# Patient Record
Sex: MALE | Race: WHITE | NOT HISPANIC OR LATINO | Employment: FULL TIME | URBAN - METROPOLITAN AREA
[De-identification: names, ages, dates, MRNs, and addresses within clinical notes are randomized per-mention and may not be internally consistent; named-entity substitution may affect disease eponyms.]

---

## 2023-12-21 ENCOUNTER — APPOINTMENT (OUTPATIENT)
Dept: ENDOCRINOLOGY | Facility: CLINIC | Age: 38
End: 2023-12-21

## 2024-01-12 DIAGNOSIS — F95.2 TOURETTE'S DISORDER: ICD-10-CM

## 2024-01-12 RX ORDER — TOPIRAMATE 50 MG/1
TABLET, FILM COATED ORAL
Qty: 270 TABLET | Refills: 2 | Status: SHIPPED | OUTPATIENT
Start: 2024-01-12 | End: 2024-04-01 | Stop reason: SDUPTHER

## 2024-03-14 ENCOUNTER — DOCUMENTATION (OUTPATIENT)
Dept: PEDIATRICS | Facility: CLINIC | Age: 39
End: 2024-03-14
Payer: COMMERCIAL

## 2024-03-22 PROBLEM — L72.3 INFECTED SEBACEOUS CYST OF SKIN: Status: RESOLVED | Noted: 2024-03-22 | Resolved: 2024-03-22

## 2024-03-22 PROBLEM — L08.9 INFECTED SEBACEOUS CYST OF SKIN: Status: RESOLVED | Noted: 2024-03-22 | Resolved: 2024-03-22

## 2024-03-22 PROBLEM — F95.2 GILLES DE LA TOURETTE'S SYNDROME: Status: ACTIVE | Noted: 2024-03-22

## 2024-03-22 PROBLEM — J30.2 SEASONAL ALLERGIES: Status: ACTIVE | Noted: 2024-03-22

## 2024-03-22 PROBLEM — S01.81XA FACIAL LACERATION: Status: RESOLVED | Noted: 2024-03-22 | Resolved: 2024-03-22

## 2024-03-22 PROBLEM — J45.909 ASTHMA (HHS-HCC): Status: ACTIVE | Noted: 2024-03-22

## 2024-03-22 RX ORDER — BUDESONIDE AND FORMOTEROL FUMARATE DIHYDRATE 80; 4.5 UG/1; UG/1
AEROSOL RESPIRATORY (INHALATION)
COMMUNITY
End: 2024-04-01 | Stop reason: SDUPTHER

## 2024-03-22 RX ORDER — CETIRIZINE HYDROCHLORIDE 10 MG/1
TABLET ORAL EVERY 24 HOURS
COMMUNITY
End: 2024-04-01 | Stop reason: SDUPTHER

## 2024-04-01 ENCOUNTER — OFFICE VISIT (OUTPATIENT)
Dept: PRIMARY CARE | Facility: CLINIC | Age: 39
End: 2024-04-01
Payer: COMMERCIAL

## 2024-04-01 VITALS
OXYGEN SATURATION: 97 % | WEIGHT: 170 LBS | HEART RATE: 64 BPM | BODY MASS INDEX: 25.76 KG/M2 | HEIGHT: 68 IN | TEMPERATURE: 98 F | SYSTOLIC BLOOD PRESSURE: 124 MMHG | DIASTOLIC BLOOD PRESSURE: 72 MMHG

## 2024-04-01 DIAGNOSIS — Z80.9 FAMILY HISTORY OF CANCER: ICD-10-CM

## 2024-04-01 DIAGNOSIS — E03.9 HYPOTHYROIDISM, UNSPECIFIED TYPE: ICD-10-CM

## 2024-04-01 DIAGNOSIS — J45.30 MILD PERSISTENT ASTHMA WITHOUT COMPLICATION (HHS-HCC): ICD-10-CM

## 2024-04-01 DIAGNOSIS — B35.1 ONYCHOMYCOSIS: ICD-10-CM

## 2024-04-01 DIAGNOSIS — Z00.00 ANNUAL PHYSICAL EXAM: Primary | ICD-10-CM

## 2024-04-01 DIAGNOSIS — Z23 ENCOUNTER FOR IMMUNIZATION: ICD-10-CM

## 2024-04-01 DIAGNOSIS — R73.9 HYPERGLYCEMIA: ICD-10-CM

## 2024-04-01 DIAGNOSIS — J30.2 SEASONAL ALLERGIES: ICD-10-CM

## 2024-04-01 DIAGNOSIS — E78.2 MIXED HYPERLIPIDEMIA: ICD-10-CM

## 2024-04-01 DIAGNOSIS — F95.2 GILLES DE LA TOURETTE'S SYNDROME: ICD-10-CM

## 2024-04-01 DIAGNOSIS — Z01.89 ENCOUNTER FOR ROUTINE LABORATORY TESTING: ICD-10-CM

## 2024-04-01 DIAGNOSIS — M79.605 PAIN OF LEFT LOWER EXTREMITY: ICD-10-CM

## 2024-04-01 DIAGNOSIS — E55.9 VITAMIN D DEFICIENCY: ICD-10-CM

## 2024-04-01 PROCEDURE — 99395 PREV VISIT EST AGE 18-39: CPT | Performed by: STUDENT IN AN ORGANIZED HEALTH CARE EDUCATION/TRAINING PROGRAM

## 2024-04-01 PROCEDURE — 1036F TOBACCO NON-USER: CPT | Performed by: STUDENT IN AN ORGANIZED HEALTH CARE EDUCATION/TRAINING PROGRAM

## 2024-04-01 RX ORDER — ALBUTEROL SULFATE 90 UG/1
2 AEROSOL, METERED RESPIRATORY (INHALATION) EVERY 4 HOURS PRN
Qty: 8 G | Refills: 1 | Status: SHIPPED | OUTPATIENT
Start: 2024-04-01 | End: 2024-05-17

## 2024-04-01 RX ORDER — BUDESONIDE AND FORMOTEROL FUMARATE DIHYDRATE 80; 4.5 UG/1; UG/1
2 AEROSOL RESPIRATORY (INHALATION)
Qty: 10.2 G | Refills: 11 | Status: SHIPPED | OUTPATIENT
Start: 2024-04-01 | End: 2025-04-01

## 2024-04-01 RX ORDER — CETIRIZINE HYDROCHLORIDE 10 MG/1
10 TABLET ORAL DAILY
Start: 2024-04-01

## 2024-04-01 RX ORDER — TIOTROPIUM BROMIDE 18 UG/1
1 CAPSULE ORAL; RESPIRATORY (INHALATION)
Qty: 30 CAPSULE | Refills: 1 | Status: SHIPPED | OUTPATIENT
Start: 2024-04-01 | End: 2024-05-31

## 2024-04-01 RX ORDER — TOPIRAMATE 50 MG/1
TABLET, FILM COATED ORAL
Qty: 270 TABLET | Refills: 3 | Status: SHIPPED | OUTPATIENT
Start: 2024-04-01 | End: 2025-04-01

## 2024-04-01 ASSESSMENT — ENCOUNTER SYMPTOMS
HEMATOLOGIC/LYMPHATIC NEGATIVE: 1
SHORTNESS OF BREATH: 1
GASTROINTESTINAL NEGATIVE: 1
ALLERGIC/IMMUNOLOGIC NEGATIVE: 1
WHEEZING: 1
NEUROLOGICAL NEGATIVE: 1
ENDOCRINE NEGATIVE: 1
PSYCHIATRIC NEGATIVE: 1
EYES NEGATIVE: 1
CARDIOVASCULAR NEGATIVE: 1
MUSCULOSKELETAL NEGATIVE: 1
CONSTITUTIONAL NEGATIVE: 1

## 2024-04-01 ASSESSMENT — PAIN SCALES - GENERAL: PAINLEVEL: 0-NO PAIN

## 2024-04-01 NOTE — PROGRESS NOTES
[facility]: [facility]  PHYSICAL EXAM      Abimael Fischer is a 38 y.o. male that is presenting today for Follow-up.    Assessment/Plan   Diagnoses and all orders for this visit:  Annual physical exam  -     Follow Up In Primary Care; Future  Encounter for routine laboratory testing  -     CBC and Auto Differential; Future  -     Basic Metabolic Panel; Future  -     Hepatic Function Panel; Future  -     Lipid Panel; Future  -     Hemoglobin A1C; Future  -     Vitamin D 25-Hydroxy,Total (for eval of Vitamin D levels); Future  -     TSH with reflex to Free T4 if abnormal; Future  Hypothyroidism, unspecified type  -     TSH with reflex to Free T4 if abnormal; Future  Mixed hyperlipidemia  -     Hepatic Function Panel; Future  -     Lipid Panel; Future  Vitamin D deficiency  -     Vitamin D 25-Hydroxy,Total (for eval of Vitamin D levels); Future  Hyperglycemia  -     Hemoglobin A1C; Future  Encounter for immunization  Mild persistent asthma without complication  -     budesonide-formoteroL (Symbicort) 80-4.5 mcg/actuation inhaler; Inhale 2 puffs 2 times a day. Rinse mouth with water after use to reduce aftertaste and incidence of candidiasis. Do not swallow.  -     cetirizine (ZyrTEC) 10 mg tablet; Take 1 tablet (10 mg) by mouth once daily.  -     albuterol (Ventolin HFA) 90 mcg/actuation inhaler; Inhale 2 puffs every 4 hours if needed for wheezing or shortness of breath.  -     tiotropium (Spiriva) 18 mcg inhalation capsule; Place 1 capsule (18 mcg) into inhaler and inhale once daily.  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  Pain of left lower extremity  -     Referral to Physical Therapy; Future  Family  history of cancer  Comments:  Leiomyosarcoma. Patient requesting referral to genetics. Discussed with him that I will refer although I suspect that they will not call him back.  Orders:  -     Referral to Genetics; Future  Seasonal allergies  -     budesonide-formoteroL (Symbicort) 80-4.5 mcg/actuation inhaler; Inhale 2 puffs 2 times a day. Rinse mouth with water after use to reduce aftertaste and incidence of candidiasis. Do not swallow.  -     cetirizine (ZyrTEC) 10 mg tablet; Take 1 tablet (10 mg) by mouth once daily.  -     albuterol (Ventolin HFA) 90 mcg/actuation inhaler; Inhale 2 puffs every 4 hours if needed for wheezing or shortness of breath.  -     tiotropium (Spiriva) 18 mcg inhalation capsule; Place 1 capsule (18 mcg) into inhaler and inhale once daily.  Onychomycosis  -     Referral to Podiatry; Future    Discussed routine and/or preventative care with the patient as outlined below:  - Labwork:   - Patient appears to be due for labwork. Ordered today.    - Imaging:   - It has been awhile since the patient has gotten routine PFTs for his asthma, will order these today.  - Immunizations:   - Discussed seasonal immunizations, including the influenza and COVID-19 immunizations.  - Patient does not appear to be due for routine immunizations at this time.   - Significant medication and problem list reconciliation done today.  - Patient notes that his rescue inhaler usage has gone up somewhat secondary to pet dander in his house. Now using his rescue multiple times / week with significant improvement in symptoms. Trial Spiriva, will give one month supply to see if this helps the situation.  - Patient noting what sounds like tingling on his Right anterolateral thigh. Somewhat persistent. Notes questionable inciting event. Will refer to physical therapy to see if this helps at all. Unclear if this is a simple musculoskeletal strain vs potential neuropathy.   - Patient noting problems with his big toes  bilaterally.    Subjective   - The patient otherwise feels well and denies any acute symptoms or concerns at this time.  - The patient denies any changes or progression of their chronic medical problems.  - The patient denies any problems or concerns with their medications.      Review of Systems   Constitutional: Negative.    HENT: Negative.     Eyes: Negative.    Respiratory:  Positive for shortness of breath and wheezing.    Cardiovascular: Negative.    Gastrointestinal: Negative.    Endocrine: Negative.    Genitourinary: Negative.    Musculoskeletal: Negative.    Skin: Negative.    Allergic/Immunologic: Negative.    Neurological: Negative.    Hematological: Negative.    Psychiatric/Behavioral: Negative.     All other systems reviewed and are negative.     Objective   Vitals:    04/01/24 1414   BP: 124/72   Pulse: 64   Temp: 36.7 °C (98 °F)   SpO2: 97%     Body mass index is 25.85 kg/m².  Physical Exam  Vitals and nursing note reviewed.   Constitutional:       General: He is not in acute distress.     Appearance: Normal appearance. He is not ill-appearing.   HENT:      Head: Normocephalic and atraumatic.      Right Ear: Tympanic membrane, ear canal and external ear normal. There is no impacted cerumen.      Left Ear: Tympanic membrane, ear canal and external ear normal. There is no impacted cerumen.      Nose: Nose normal.      Mouth/Throat:      Mouth: Mucous membranes are moist.      Pharynx: Oropharynx is clear. No oropharyngeal exudate or posterior oropharyngeal erythema.   Eyes:      General: No scleral icterus.        Right eye: No discharge.         Left eye: No discharge.      Extraocular Movements: Extraocular movements intact.      Conjunctiva/sclera: Conjunctivae normal.      Pupils: Pupils are equal, round, and reactive to light.   Neck:      Vascular: No carotid bruit.   Cardiovascular:      Rate and Rhythm: Normal rate and regular rhythm.      Pulses: Normal pulses.      Heart sounds: Normal heart  "sounds. No murmur heard.     No friction rub. No gallop.   Pulmonary:      Effort: Pulmonary effort is normal. No respiratory distress.      Breath sounds: Normal breath sounds.   Abdominal:      General: Abdomen is flat. Bowel sounds are normal.      Palpations: Abdomen is soft.      Tenderness: There is no abdominal tenderness.      Hernia: No hernia is present.   Musculoskeletal:         General: No swelling. Normal range of motion.      Cervical back: Normal range of motion.   Lymphadenopathy:      Cervical: No cervical adenopathy.   Skin:     General: Skin is warm and dry.      Coloration: Skin is not jaundiced.      Findings: No rash.   Neurological:      General: No focal deficit present.      Mental Status: He is alert and oriented to person, place, and time. Mental status is at baseline.   Psychiatric:         Mood and Affect: Mood normal.         Behavior: Behavior normal.       Diagnostic Results   Lab Results   Component Value Date    GLUCOSE 92 01/14/2023    CALCIUM 9.4 01/14/2023     01/14/2023    K 4.0 01/14/2023    CO2 21 (L) 01/14/2023     01/14/2023    BUN 14 01/14/2023    CREATININE 1.2 01/14/2023     Lab Results   Component Value Date    ALT 64 (H) 01/14/2023    AST 39 01/14/2023    ALKPHOS 81 01/14/2023    BILITOT 0.3 01/14/2023     Lab Results   Component Value Date    WBC 6.0 01/14/2023    HGB 17.7 (H) 01/14/2023    HCT 52.2 (H) 01/14/2023    MCV 87.4 01/14/2023     01/14/2023     Lab Results   Component Value Date    CHOL 197 01/14/2023    CHOL 179 09/28/2019    CHOL 194 10/13/2018     Lab Results   Component Value Date    HDL 49 01/14/2023    HDL 53 09/28/2019    HDL 49 10/13/2018     Lab Results   Component Value Date    LDLCALC 128 01/14/2023    LDLCALC 100 09/28/2019    LDLCALC 119 10/13/2018     Lab Results   Component Value Date    TRIG 100 01/14/2023    TRIG 130 09/28/2019    TRIG 132 10/13/2018     No components found for: \"CHOLHDL\"  Lab Results   Component Value " Date    HGBA1C 4.9 01/14/2023     Other labs not included in the list above were reviewed either before or during this encounter.    History   Past Medical History:   Diagnosis Date    Facial laceration 03/22/2024    Infected sebaceous cyst of skin 03/22/2024     No past surgical history on file.  No family history on file.  Social History     Socioeconomic History    Marital status:      Spouse name: Not on file    Number of children: Not on file    Years of education: Not on file    Highest education level: Not on file   Occupational History    Not on file   Tobacco Use    Smoking status: Never    Smokeless tobacco: Never   Vaping Use    Vaping Use: Never used   Substance and Sexual Activity    Alcohol use: Not Currently    Drug use: Never    Sexual activity: Not on file   Other Topics Concern    Not on file   Social History Narrative    Not on file     Social Determinants of Health     Financial Resource Strain: Not on file   Food Insecurity: Not on file   Transportation Needs: Not on file   Physical Activity: Not on file   Stress: Not on file   Social Connections: Not on file   Intimate Partner Violence: Not on file   Housing Stability: Not on file     No Known Allergies  Current Outpatient Medications on File Prior to Visit   Medication Sig Dispense Refill    [DISCONTINUED] albuterol 108 (90 Base) MCG/ACT inhaler every 6 hours.      [DISCONTINUED] budesonide-formoteroL (Symbicort) 80-4.5 mcg/actuation inhaler 2 PUFFS INHALATION TWICE A DAY RINSE MOUTH AFTER USE. 30 DAYS      [DISCONTINUED] cetirizine (ZyrTEC) 10 mg tablet once every 24 hours.      [DISCONTINUED] topiramate (Topamax) 50 mg tablet 50mg AM, 100 mg  tablet 2     No current facility-administered medications on file prior to visit.     Immunization History   Administered Date(s) Administered    Flu vaccine (IIV4), preservative free *Check age/dose* 11/22/2023    Flu vaccine, quadrivalent, recombinant, preservative free, adult (FLUBLOK)  11/19/2021, 01/09/2023    Influenza, injectable, MDCK, quadrivalent 11/17/2020    Influenza, injectable, quadrivalent 09/27/2019    Moderna SARS-CoV-2 Vaccination 04/07/2021, 05/07/2021, 12/03/2021    Pfizer Purple Cap SARS-CoV-2 11/22/2023    Tdap vaccine, age 7 year and older (BOOSTRIX, ADACEL) 11/26/2019, 03/15/2024    Varicella vaccine, subcutaneous (VARIVAX) 09/24/2001, 10/23/2001     Patient's medical history was reviewed and updated either before or during this encounter.       Jalen Millan MD

## 2024-04-18 DIAGNOSIS — M79.604 RIGHT LEG PAIN: ICD-10-CM

## 2024-04-29 ENCOUNTER — HOSPITAL ENCOUNTER (OUTPATIENT)
Dept: RESPIRATORY THERAPY | Facility: HOSPITAL | Age: 39
Setting detail: THERAPIES SERIES
Discharge: HOME | End: 2024-04-29
Payer: COMMERCIAL

## 2024-04-29 DIAGNOSIS — J45.30 MILD PERSISTENT ASTHMA WITHOUT COMPLICATION (HHS-HCC): ICD-10-CM

## 2024-04-29 PROCEDURE — 94640 AIRWAY INHALATION TREATMENT: CPT

## 2024-04-29 PROCEDURE — 94726 PLETHYSMOGRAPHY LUNG VOLUMES: CPT

## 2024-04-29 PROCEDURE — 2500000002 HC RX 250 W HCPCS SELF ADMINISTERED DRUGS (ALT 637 FOR MEDICARE OP, ALT 636 FOR OP/ED)

## 2024-04-29 RX ORDER — ALBUTEROL SULFATE 0.83 MG/ML
SOLUTION RESPIRATORY (INHALATION)
Status: COMPLETED
Start: 2024-04-29 | End: 2024-04-29

## 2024-04-29 RX ADMIN — ALBUTEROL SULFATE: 2.5 SOLUTION RESPIRATORY (INHALATION) at 08:15

## 2024-05-06 ENCOUNTER — APPOINTMENT (OUTPATIENT)
Dept: PHYSICAL THERAPY | Facility: CLINIC | Age: 39
End: 2024-05-06
Payer: COMMERCIAL

## 2024-05-07 ENCOUNTER — OFFICE VISIT (OUTPATIENT)
Dept: PODIATRY | Facility: CLINIC | Age: 39
End: 2024-05-07
Payer: COMMERCIAL

## 2024-05-07 DIAGNOSIS — M79.672 FOOT PAIN, BILATERAL: Primary | ICD-10-CM

## 2024-05-07 DIAGNOSIS — M79.671 FOOT PAIN, BILATERAL: Primary | ICD-10-CM

## 2024-05-07 DIAGNOSIS — B35.1 ONYCHOMYCOSIS: ICD-10-CM

## 2024-05-07 PROCEDURE — 99203 OFFICE O/P NEW LOW 30 MIN: CPT | Performed by: PODIATRIST

## 2024-05-07 PROCEDURE — 1036F TOBACCO NON-USER: CPT | Performed by: PODIATRIST

## 2024-05-07 RX ORDER — CICLOPIROX 80 MG/ML
SOLUTION TOPICAL NIGHTLY
Qty: 6.6 ML | Refills: 3 | Status: SHIPPED | OUTPATIENT
Start: 2024-05-07

## 2024-05-07 NOTE — PROGRESS NOTES
This is a 38 y.o. male new patient for foot pain and nail concern    History of Present Illness:   Patient states they are here for foot pain  Has concern of nail changes  Also states his leg and back hurt when walking  Not sure if he is in supportive shoes  Denies trauma  No other pedal complaints  Denies DM      Past Medical History  Past Medical History:   Diagnosis Date    Facial laceration 03/22/2024    Infected sebaceous cyst of skin 03/22/2024       Medications and Allergies have been reviewed.    Review Of Systems:  GENERAL: No weight loss, malaise or fevers.  HEENT: Negative for frequent or significant headaches,   RESPIRATORY: Negative for cough, wheezing or shortness of breath.  CARDIOVASCULAR: Negative for chest pain, leg swelling or palpitations.    Physical Exam:  Patient is a pleasant, cooperative, well developed 38 y.o.  adult male. The patient is alert and oriented to time, place and person.   Patient has normal affect and mood.    Examination of Both Lower Extremities:   Objective:   Vasc: DP and PT pulses are palpable bilateral.  CFT is less than 3 seconds bilateral.  Skin temperature is warm to cool proximal to distal bilateral.      Neuro: Vibratory, light touch and proprioception are intact bilateral.      Derm: Nails 1-5 bilateral are intact. Lat hallux bl notes thick and discolored. Mild lifting at  base.    Skin is supple with normal texture and turgor noted.  Webspaces are clean, dry and intact bilateral.      Ortho: Muscle strength is 5/5 for all pedal groups tested.    1. Foot pain, bilateral        2. Onychomycosis  Referral to Podiatry    ciclopirox (Penlac) 8 % solution        Patient exam and eval  Discussed foot pain, posture, types of shoes for support  Recommend stiff supportive shoe gear  Shoes that do not twist or bend  Recommend powerstep or superfeet  Discussed types of shoes to look for  Discussed and deferred oral antifungal  Recommend topical  Called in rx  Use as  directed  Fu prn  Pt in agreement to plan  All questions answered    Nidia Scott, PATRICIO  [phone]  [facility] [address]  Fax: [phone]

## 2024-05-08 DIAGNOSIS — J45.40 MODERATE PERSISTENT ASTHMA WITHOUT COMPLICATION (HHS-HCC): Primary | ICD-10-CM

## 2024-05-17 DIAGNOSIS — J30.2 SEASONAL ALLERGIES: ICD-10-CM

## 2024-05-17 DIAGNOSIS — J45.30 MILD PERSISTENT ASTHMA WITHOUT COMPLICATION (HHS-HCC): ICD-10-CM

## 2024-05-17 RX ORDER — ALBUTEROL SULFATE 90 UG/1
2 AEROSOL, METERED RESPIRATORY (INHALATION) EVERY 4 HOURS PRN
Qty: 8.5 G | Refills: 3 | Status: SHIPPED | OUTPATIENT
Start: 2024-05-17

## 2024-05-22 ENCOUNTER — APPOINTMENT (OUTPATIENT)
Dept: RADIOLOGY | Facility: HOSPITAL | Age: 39
End: 2024-05-22
Payer: COMMERCIAL

## 2024-05-22 ENCOUNTER — EVALUATION (OUTPATIENT)
Dept: PHYSICAL THERAPY | Facility: CLINIC | Age: 39
End: 2024-05-22
Payer: COMMERCIAL

## 2024-05-22 DIAGNOSIS — M79.604 RIGHT LEG PAIN: Primary | ICD-10-CM

## 2024-05-22 PROCEDURE — 97140 MANUAL THERAPY 1/> REGIONS: CPT | Mod: GP | Performed by: PHYSICAL THERAPIST

## 2024-05-22 PROCEDURE — 97161 PT EVAL LOW COMPLEX 20 MIN: CPT | Mod: GP | Performed by: PHYSICAL THERAPIST

## 2024-05-22 ASSESSMENT — PAIN - FUNCTIONAL ASSESSMENT: PAIN_FUNCTIONAL_ASSESSMENT: 0-10

## 2024-05-22 ASSESSMENT — PAIN DESCRIPTION - DESCRIPTORS: DESCRIPTORS: DULL;TINGLING

## 2024-05-22 ASSESSMENT — PAIN SCALES - GENERAL: PAINLEVEL_OUTOF10: 2

## 2024-05-22 NOTE — PROGRESS NOTES
Physical Therapy Evaluation and Treatment      Patient Name: Abimael Fischer  MRN: [id number]  Today's Date: 5/22/2024  Time Calculation  Start Time: 0847  Stop Time: 0924  Time Calculation (min): 37 min  PT Therapeutic Procedures Time Entry  Manual Therapy Time Entry: 10  Therapeutic Activity Time Entry: 7  Low complexity due to patient's clinical presentation being stable and uncomplicated by any significant comorbidities that may affect rehab tolerance and progression.    Insurance:  Visit number: 1 of 7  Authorization info: NO AUTH / 20V MAX - 0 used / $40 COPAY   Insurance Type: Payor: [employer] / Plan: [employer] / Product Type: *No Product type* /     Current Problem:   1. Right leg pain  Referral to Physical Therapy    Follow Up In Physical Therapy          Subjective    General:  General  Reason for Referral: Leg pain  Referred By: Dr. valenzuela  General Comment: Pt presents to therapy with R leg pain. He went to see his PCP and mentioned about 3-4 years ago he was at work sitting in his office chair and his leg got stuck until the chair. He reported a feeling of severe pulling, since than he gets a tingling sensation that is persistant along with a loss of sensation along the skin. He reports the sensation comes and goes. His neurologist stated that it is most likely frontal nerve that could be pushed with tight clothing. He does not report any significant history of back pain other than dull discomfort when he is immoble in one position. He does not currently have any workout routine other than walking outside occasionally. He does not report any referring pain down into the foot/ankle region.  Precautions:  Precautions  Precautions Comment: None  Pain:  Pain Assessment  Pain Assessment: 0-10  Pain Score: 2  Pain Type: Chronic pain  Pain Location: Leg (IT band region)  Pain Orientation: Right  Pain Descriptors: Dull, Tingling    Prior Level of Function:  Prior Function Per Pt/Caregiver  Report  Level of [place]: Independent with ADLs and functional transfers, Independent with homemaking with ambulation    Objective   HIP    Hip Palpation/Joint Mobility   Palpation / Joint Mobility Comment: B IT band tightness; R SI slightly hypomobile compared to L; lumbar mobiltiy appears WNL slight discomfort during assessment; nerve hypersensitivity along IT band and common fibular HTrp  Lumbar AROM  Lumbar AROM WFL: yes  Hip AROM  Hip AROM WFL: yes    Specific Lower Extremity MMT  R Iliopsoas: (5/5): 4+  L Iliopsoas: (5/5): 5  R Gluteals (sidelying): (5/5): 4+  L Gluteals (sidelying): (5/5): 5    Gait  Gait Comment: normalized gait slight reduced hip mobiltiy with general stiffness noted    Outcome Measures:  Other Measures  Lower Extremity Funtional Score (LEFS): 79     Treatments:  Therapeutic Activity  Therapeutic Activity Performed: Yes  Therapeutic Activity 1: Educated on eval findings including IT band, connective tissue/fascia and nerve points in relation to findings  Therapeutic Activity 2: Review of HEP  - Sidelying IT Band Foam Roll Mobilization  - 1 x daily - 7 x weekly  - Sidelying Hip Abduction at Wall  - 1 x daily - 7 x weekly - 3 sets - 10 reps  - Seated Figure 4 Piriformis Stretch  - 1 x daily - 7 x weekly - 1-2 sets - 3 reps - 15-30 seconds hold  - Supine ITB Stretch  - 1 x daily - 7 x weekly - 1-2 sets - 3 reps - 15-30 seconds hold  - Straight leg raise with abdominal brace  - 1 x daily - 7 x weekly - 3 sets - 10 reps  Manual Therapy  Manual Therapy Performed: Yes  Manual Therapy Activity 1: Dry needling.  IDN performed this date. Pt educated on risks and benefits of dry needling. Pt provided verbal consent. All universal precautions followed.    1 - 30 mm IT band Htrp R  1 - 30 mm common fibular Htrp R  2 - 30 mm saphenous Htrp R    No adverse response. All IDN guidelines and safety protocols followed.      Assessment   Assessment:  PT Assessment Results: Decreased strength, Pain,  Impaired sensation  Assessment Comment: Pt is a 38 y.o male presenting to therapy with chief complaint of R LE discomfort/impaired sensation and nerve problems. Pt found to have tightness along IT band and sensitivity of nerve points throughout R LE. Pt does have some wekaness within stabilizing muscles of R LE reducing stability along with slight hypomobility of R SI suggesting increased strain on neural and muscle structures. At this time pt would benefit from skilled physical therapy in order to reduce symptoms and prevent further functional decline.    Plan:  Treatment/Interventions: Dry needling, Cryotherapy, Education/ Instruction, Gait training, Hot pack, Manual therapy, Neuromuscular re-education, Taping techniques, Therapeutic activities, Therapeutic exercises, Mechanical traction  PT Plan: Skilled PT  PT Frequency: 1 time per week  Duration: 6 weeks + eval  Onset Date: 05/22/24  Number of Treatments Authorized: 20  Rehab Potential: Good  Plan of Care Agreement: Patient    OP EDUCATION:  Outpatient Education  Individual(s) Educated: Patient  Education Provided: Anatomy, Home Exercise Program, POC  Risk and Benefits Discussed with Patient/Caregiver/Other: yes  Patient/Caregiver Demonstrated Understanding: yes  Plan of Care Discussed and Agreed Upon: yes  Patient Response to Education: Patient/Caregiver Verbalized Understanding of Information, Patient/Caregiver Performed Return Demonstration of Exercises/Activities, Patient/Caregiver Asked Appropriate Questions    Goals:  Active       PT Problem       PT Goal 1       Start:  05/22/24    Expected End:  07/31/24       Pt will be 100% IND with HEP in 6 weeks in order to maintain progress with therapy.           PT Goal 2       Start:  05/22/24    Expected End:  07/31/24       Pt will reduce pain/symptom levels to no more than 0/10 in 6 weeks in order to improve sitting, standing and ambulation.          PT Goal 3       Start:  05/22/24    Expected End:  07/31/24        Pt will demonstrate subjective improvement of ADLs and recreational activities through improved score of 80 on LEFS in 6 weeks.          PT Goal 4       Start:  05/22/24    Expected End:  07/31/24       Pt will improve R LE strength to 5/5 in 6 weeks in order to improve transfers, ambulation and stair negotiation

## 2024-05-23 ENCOUNTER — APPOINTMENT (OUTPATIENT)
Dept: PHYSICAL THERAPY | Facility: CLINIC | Age: 39
End: 2024-05-23
Payer: COMMERCIAL

## 2024-05-29 ENCOUNTER — TREATMENT (OUTPATIENT)
Dept: PHYSICAL THERAPY | Facility: CLINIC | Age: 39
End: 2024-05-29
Payer: COMMERCIAL

## 2024-05-29 ENCOUNTER — TELEPHONE (OUTPATIENT)
Dept: PRIMARY CARE | Facility: CLINIC | Age: 39
End: 2024-05-29

## 2024-05-29 DIAGNOSIS — M79.604 RIGHT LEG PAIN: ICD-10-CM

## 2024-05-29 PROCEDURE — 97140 MANUAL THERAPY 1/> REGIONS: CPT | Mod: GP,CQ

## 2024-05-29 PROCEDURE — 97110 THERAPEUTIC EXERCISES: CPT | Mod: GP,CQ

## 2024-05-29 NOTE — TELEPHONE ENCOUNTER
MRI denied because there is no XR. Scheduling requesting order for XR so that they can proceed with MRI.

## 2024-05-29 NOTE — PROGRESS NOTES
Physical Therapy Treatment    Patient Name: Abimael Fischer  MRN: [id number]  Today's Date: 5/29/2024  Time Calculation  Start Time: 0758  Stop Time: 0823  Time Calculation (min): 25 min  PT Therapeutic Procedures Time Entry  Manual Therapy Time Entry: 10  Therapeutic Exercise Time Entry: 15    Insurance:  Visit number: 2 of 7  Authorization info:  [employer] - NO AUTH / 20V MAX - 0 used / $40 COPAY/day, then 100% COVERAGE / OOP $3000 - $80 met  / 5/3/24  Insurance Type: Payor: [employer] / Plan: [employer] / Product Type: *No Product type* /     Current Problem   1. Right leg pain  Follow Up In Physical Therapy          Subjective   General    Pt felt that the DN helped. Still has some sx's but not as bad.  Precautions:  none   Pain    0  Post Treatment Pain Level 0    Objective   Demonstrated good R functional quad strength    Treatments:  Therapeutic Exercise:  Reviewed existing HEP  Added clam shells with PTB  Towel slides    Manual:  DN performed by  Assessment   Assessment:    Pt had no pain after session.     Plan:    Monitor affects of DN    OP EDUCATION:   Added to HEP    Goals:   Active       PT Problem       PT Goal 1       Start:  05/22/24    Expected End:  07/31/24       Pt will be 100% IND with HEP in 6 weeks in order to maintain progress with therapy.           PT Goal 2       Start:  05/22/24    Expected End:  07/31/24       Pt will reduce pain/symptom levels to no more than 0/10 in 6 weeks in order to improve sitting, standing and ambulation.          PT Goal 3       Start:  05/22/24    Expected End:  07/31/24       Pt will demonstrate subjective improvement of ADLs and recreational activities through improved score of 80 on LEFS in 6 weeks.          PT Goal 4       Start:  05/22/24    Expected End:  07/31/24       Pt will improve R LE strength to 5/5 in 6 weeks in order to improve transfers, ambulation and stair negotiation

## 2024-06-05 ENCOUNTER — TREATMENT (OUTPATIENT)
Dept: PHYSICAL THERAPY | Facility: CLINIC | Age: 39
End: 2024-06-05
Payer: COMMERCIAL

## 2024-06-05 DIAGNOSIS — M79.604 RIGHT LEG PAIN: Primary | ICD-10-CM

## 2024-06-05 PROCEDURE — 97140 MANUAL THERAPY 1/> REGIONS: CPT | Mod: GP | Performed by: PHYSICAL THERAPIST

## 2024-06-05 PROCEDURE — 97110 THERAPEUTIC EXERCISES: CPT | Mod: GP | Performed by: PHYSICAL THERAPIST

## 2024-06-05 ASSESSMENT — PAIN SCALES - GENERAL: PAINLEVEL_OUTOF10: 2

## 2024-06-05 ASSESSMENT — PAIN - FUNCTIONAL ASSESSMENT: PAIN_FUNCTIONAL_ASSESSMENT: 0-10

## 2024-06-05 NOTE — PROGRESS NOTES
Physical Therapy Treatment    Patient Name: Abimael Fischer  MRN: [id number]  Today's Date: 6/5/2024  Time Calculation  Start Time: 0756  Stop Time: 0834  Time Calculation (min): 38 min  PT Therapeutic Procedures Time Entry  Manual Therapy Time Entry: 8  Therapeutic Exercise Time Entry: 30    Insurance:  Visit number: 3 of 7  Authorization info: NO AUTH / 20V MAX / $40 COPAY/day   Insurance Type: Payor: [employer] / Plan: AET [employer] / Product Type: *No Product type* /     Current Problem   1. Right leg pain  Follow Up In Physical Therapy          Subjective   General   Reason for Referral: Leg pain  Referred By: Dr. valenzuela  General Comment: Pt reports that he over extended his leg behind him and had an incresae in symptoms as well as when he performed the towel slides, created a shooting/tingling sensation. Otherwise no new complaints or issues. During the dry needling he does have an improved sensation within the problem areas. He has been utilizing roller on IT band and does notice difference R to L  Precautions:  Precautions  Precautions Comment: None  Pain   Pain Assessment: 0-10  Pain Score: 2  Post Treatment Pain Level 1    Objective   Slight hip flexion compensation with R abd - VC to correct   Noted increased HS tension   Increased trunk rotation during R stance curtsy lunge compared to L     Treatments:  Therapeutic Exercise:  Therapeutic Exercise  Therapeutic Exercise Performed: Yes  Therapeutic Exercise Activity 1: Recumbent  bike 5' warm up  Therapeutic Exercise Activity 2: SLR with SL bridge 10x3 B  Therapeutic Exercise Activity 3: s/l hip abd yellow loop 10x3  Therapeutic Exercise Activity 4: curtsy lunge 10x2 B  Therapeutic Exercise Activity 5: russian squat 10x2  Therapeutic Exercise Activity 6: quad firehydrant 10x3    Manual:  Manual Therapy  Manual Therapy Performed: Yes  Manual Therapy Activity 1: Dry needling  IDN performed this date. Pt educated on risks and benefits of dry  needling. Pt provided verbal consent. All universal precautions followed.    3 - 50 mm IT band  2 - 50 mm Lateral R quad     No adverse response. All IDN guidelines and safety protocols followed.      Assessment   Assessment:   PT Assessment  Assessment Comment: Pt tolerated session well, focus on glut/hip abd strengthening for IT band support as symptoms continue to be presenting along IT band and within homeostatic trigger point however psoas involvement may be occuring due to extension based symptom reporduction as well. Above noted compensations suggestive of unequal stability along pelvic and core. WIll continue to treat and assess throughout care.    Plan:    Continue with dry needling, stability and flexibility along pelvis/hip    OP EDUCATION:   Updated HEP     Goals:   Active       PT Problem       PT Goal 1       Start:  05/22/24    Expected End:  07/31/24       Pt will be 100% IND with HEP in 6 weeks in order to maintain progress with therapy.           PT Goal 2       Start:  05/22/24    Expected End:  07/31/24       Pt will reduce pain/symptom levels to no more than 0/10 in 6 weeks in order to improve sitting, standing and ambulation.          PT Goal 3       Start:  05/22/24    Expected End:  07/31/24       Pt will demonstrate subjective improvement of ADLs and recreational activities through improved score of 80 on LEFS in 6 weeks.          PT Goal 4       Start:  05/22/24    Expected End:  07/31/24       Pt will improve R LE strength to 5/5 in 6 weeks in order to improve transfers, ambulation and stair negotiation

## 2024-06-07 ENCOUNTER — APPOINTMENT (OUTPATIENT)
Dept: RADIOLOGY | Facility: HOSPITAL | Age: 39
End: 2024-06-07
Payer: COMMERCIAL

## 2024-06-12 ENCOUNTER — TREATMENT (OUTPATIENT)
Dept: PHYSICAL THERAPY | Facility: CLINIC | Age: 39
End: 2024-06-12
Payer: COMMERCIAL

## 2024-06-12 DIAGNOSIS — M79.604 RIGHT LEG PAIN: ICD-10-CM

## 2024-06-12 PROCEDURE — 97110 THERAPEUTIC EXERCISES: CPT | Mod: GP,CQ

## 2024-06-12 ASSESSMENT — PAIN SCALES - GENERAL: PAINLEVEL_OUTOF10: 2

## 2024-06-12 ASSESSMENT — PAIN - FUNCTIONAL ASSESSMENT: PAIN_FUNCTIONAL_ASSESSMENT: 0-10

## 2024-06-12 NOTE — PROGRESS NOTES
"Physical Therapy Treatment    Patient Name: Abimael Fischer  MRN: [id number]  Today's Date: 6/12/2024  Time Calculation  Start Time: 0813  Stop Time: 0852  Time Calculation (min): 39 min  PT Therapeutic Procedures Time Entry  Therapeutic Exercise Time Entry: 39    Insurance:  Visit number: 4 of 7  Authorization info: NO AUTH / 20V MAX / $40 COPAY/day   Insurance Type: Payor: [employer] / Plan: [employer] / Product Type: *No Product type* /     Current Problem   1. Right leg pain  Follow Up In Physical Therapy          Subjective   General    Patient reports he has experienced no significant changes recently, remains diligent with HEP instructions. Still gets intermittent intense burning sensation which radiates down lateral thigh and across knee to shin.     Precautions:   None    Pain   Pain Assessment: 0-10  Pain Score: 2  Pain Location: Leg  Pain Orientation: Right    Post Treatment Pain Level   2/10    Objective     Hip flexor compensation on RLE with hip ABD     Valsalva maneuver during therex     R HS tightness leading to PPT     Treatments:  Therapeutic Exercise:  Therapeutic Exercise  Therapeutic Exercise Performed: Yes  Therapeutic Exercise Activity 1: Recumbent bike 5' warm up  Therapeutic Exercise Activity 2: seated piriformis stretch 3 x 45\" B  Therapeutic Exercise Activity 3: SL bridge 10x2  Therapeutic Exercise Activity 4: modified side plank with hip abd 10x2 B  Therapeutic Exercise Activity 5: functional HS stretch  Therapeutic Exercise Activity 6: review/updated HEP      Assessment   Assessment:    Very challenged by modified side planks with difficulty elevating hips in order to perform exercise. Noted significant HS tightness bilat (R>L) leading to PPT and increased knee flexion in neutral position, which can lead to increased neural tension. Will continue to progress appropriately.     Plan:    Core stability, pelvic/hip strengthening, hip mobility/flexibility    OP EDUCATION:   " Updated HEP    Goals:   Active       PT Problem       PT Goal 1       Start:  05/22/24    Expected End:  07/31/24       Pt will be 100% IND with HEP in 6 weeks in order to maintain progress with therapy.           PT Goal 2       Start:  05/22/24    Expected End:  07/31/24       Pt will reduce pain/symptom levels to no more than 0/10 in 6 weeks in order to improve sitting, standing and ambulation.          PT Goal 3       Start:  05/22/24    Expected End:  07/31/24       Pt will demonstrate subjective improvement of ADLs and recreational activities through improved score of 80 on LEFS in 6 weeks.          PT Goal 4       Start:  05/22/24    Expected End:  07/31/24       Pt will improve R LE strength to 5/5 in 6 weeks in order to improve transfers, ambulation and stair negotiation

## 2024-06-19 ENCOUNTER — TREATMENT (OUTPATIENT)
Dept: PHYSICAL THERAPY | Facility: CLINIC | Age: 39
End: 2024-06-19
Payer: COMMERCIAL

## 2024-06-19 DIAGNOSIS — M79.604 RIGHT LEG PAIN: ICD-10-CM

## 2024-06-19 PROCEDURE — 97110 THERAPEUTIC EXERCISES: CPT | Mod: GP | Performed by: PHYSICAL THERAPIST

## 2024-06-19 ASSESSMENT — PAIN SCALES - GENERAL: PAINLEVEL_OUTOF10: 2

## 2024-06-19 ASSESSMENT — PAIN - FUNCTIONAL ASSESSMENT: PAIN_FUNCTIONAL_ASSESSMENT: 0-10

## 2024-06-19 NOTE — PROGRESS NOTES
"Physical Therapy Treatment    Patient Name: Abimael Fischer  MRN: [id number]  Today's Date: 6/19/2024  Time Calculation  Start Time: 0805  Stop Time: 0845  Time Calculation (min): 40 min  PT Therapeutic Procedures Time Entry  Therapeutic Exercise Time Entry: 40    Insurance:  Visit number: 5 of 7  Authorization info: NO AUTH / 20V MAX / $40 COPAY/day   Insurance Type: Payor: [employer] / Plan: AET [employer] / Product Type: *No Product type* /     Current Problem   1. Right leg pain  Follow Up In Physical Therapy          Subjective   General   Reason for Referral: Leg pain  Referred By: Dr. valenzuela  General Comment: Pt reports he felt good without any symptoms until this morning and reports only mild symptoms.  Precautions:  Precautions  Precautions Comment: None  Pain   Pain Assessment: 0-10  Pain Score: 2  Post Treatment Pain Level sore    Objective   Reduced R stability with side planks and bridge exercises     Treatments:  Therapeutic Exercise:  Therapeutic Exercise  Therapeutic Exercise Performed: Yes  Therapeutic Exercise Activity 1: Recumbent bike 5' warm up  Therapeutic Exercise Activity 2: modified side plank with hip abd 10x2 B  Therapeutic Exercise Activity 3: SL bridge on BOSU 10x3 B  Therapeutic Exercise Activity 4: DKTC bridge on SB 10x3 (ball supported by SPT)  Therapeutic Exercise Activity 5: supine figure 4 stretch 30\"x3  Therapeutic Exercise Activity 6: Seated HS stretch 30\"x3 B  Therapeutic Exercise Activity 7: yellow SB roll out with lateral stretch 5\" hold 10x3      Assessment   Assessment:   PT Assessment  Assessment Comment: Pt tolerated session well continue to note weakness within core and oblique as well as R hip stabilizers potentially created continued tension on nerves creating symptoms. Exercises focused on above deficits this date, educated on importance of core stability. Will continue as tolerated.    Plan:    Core strengthening     OP EDUCATION:   Continue with " established HEP  - added in seated lumbar stretch     Goals:   Active       PT Problem       PT Goal 1       Start:  05/22/24    Expected End:  07/31/24       Pt will be 100% IND with HEP in 6 weeks in order to maintain progress with therapy.           PT Goal 2       Start:  05/22/24    Expected End:  07/31/24       Pt will reduce pain/symptom levels to no more than 0/10 in 6 weeks in order to improve sitting, standing and ambulation.          PT Goal 3       Start:  05/22/24    Expected End:  07/31/24       Pt will demonstrate subjective improvement of ADLs and recreational activities through improved score of 80 on LEFS in 6 weeks.          PT Goal 4       Start:  05/22/24    Expected End:  07/31/24       Pt will improve R LE strength to 5/5 in 6 weeks in order to improve transfers, ambulation and stair negotiation

## 2024-06-26 ENCOUNTER — APPOINTMENT (OUTPATIENT)
Dept: PHYSICAL THERAPY | Facility: CLINIC | Age: 39
End: 2024-06-26
Payer: COMMERCIAL

## 2024-07-03 ENCOUNTER — TREATMENT (OUTPATIENT)
Dept: PHYSICAL THERAPY | Facility: CLINIC | Age: 39
End: 2024-07-03
Payer: COMMERCIAL

## 2024-07-03 DIAGNOSIS — M79.604 RIGHT LEG PAIN: ICD-10-CM

## 2024-07-03 PROCEDURE — 97110 THERAPEUTIC EXERCISES: CPT | Mod: GP | Performed by: PHYSICAL THERAPIST

## 2024-07-03 PROCEDURE — 97530 THERAPEUTIC ACTIVITIES: CPT | Mod: GP | Performed by: PHYSICAL THERAPIST

## 2024-07-03 ASSESSMENT — PAIN SCALES - GENERAL: PAINLEVEL_OUTOF10: 0 - NO PAIN

## 2024-07-03 ASSESSMENT — PAIN - FUNCTIONAL ASSESSMENT: PAIN_FUNCTIONAL_ASSESSMENT: 0-10

## 2024-07-03 NOTE — PROGRESS NOTES
"Physical Therapy Discharge/Treatment    Patient Name: Abimael Fischer  MRN: [id number]  Today's Date: 7/3/2024  Time Calculation  Start Time: 0847  Stop Time: 0927  Time Calculation (min): 40 min  PT Therapeutic Procedures Time Entry  Therapeutic Exercise Time Entry: 30  Therapeutic Activity Time Entry: 10    Insurance:  Visit number: 6 of 7  Authorization info: NO AUTH / 20V MAX / $40 COPAY/day   Insurance Type: Payor: [employer] / [facility] / Product Type: *No Product type* /     Current Problem   1. Right leg pain  Follow Up In Physical Therapy          Subjective   General   Reason for Referral: Leg pain  Referred By: Dr. valenzuela  General Comment: Pt reported no symptoms over the last couple weeks, compliant with HEP.  Precautions:  Precautions  Precautions Comment: None  Pain   Pain Assessment: 0-10  0-10 (Numeric) Pain Score: 0 - No pain  Post Treatment Pain Level 0    Objective   B MMT 5/5   B LE ROM WNL  Pelvic alignment equal B with normal joint mobilization in prone position     Core weakness noted    Treatments:  Therapeutic Exercise:  Therapeutic Exercise  Therapeutic Exercise Performed: Yes  Therapeutic Exercise Activity 1: Recumbent bike 5' warm up  Therapeutic Exercise Activity 2: standing firehydrant BTB @ knees 10x3 B  Therapeutic Exercise Activity 3: mini squat lateral steps BTB @ ankles 3 laps  Therapeutic Exercise Activity 4: s/l plank 30\"x3 B  Therapeutic Exercise Activity 5: bridge with march BTB 10x3  Therapeutic Exercise Activity 6: fwd lunges 10x3  Therapeutic Exercise Activity 7: lateral lunge 10x3  Therapeutic activity:  Therapeutic Activity  Therapeutic Activity Performed: Yes  Therapeutic Activity 1: Re-assessment performed this date      Assessment   Assessment:   PT Assessment  Assessment Comment: Pt has made good progres since the start of therapy. Strength and sensation of the R LE have improved with reduced complaints of flare ups. Core weakness is still noted, " but has shown improvements. Pt was able to complete all assigned activities with progressions without symptoms. PT and pt discussed going self-independent with HEP and both are in agreement with discharge this date.    Plan:    Discharge this date    OP EDUCATION:   Continue with established HEP and updated HEP with above exercises- Access Code [email or web address]    Goals:   Resolved       PT Problem       PT Goal 1 (Met)       Start:  05/22/24    Expected End:  07/31/24    Resolved:  07/03/24    Pt will be 100% IND with HEP in 6 weeks in order to maintain progress with therapy.           PT Goal 2 (Adequate for Discharge)       Start:  05/22/24    Expected End:  07/31/24    Resolved:  07/03/24    Pt will reduce pain/symptom levels to no more than 0/10 in 6 weeks in order to improve sitting, standing and ambulation.          PT Goal 3 (Adequate for Discharge)       Start:  05/22/24    Expected End:  07/31/24    Resolved:  07/03/24    Pt will demonstrate subjective improvement of ADLs and recreational activities through improved score of 80 on LEFS in 6 weeks.          PT Goal 4 (Met)       Start:  05/22/24    Expected End:  07/31/24    Resolved:  07/03/24    Pt will improve R LE strength to 5/5 in 6 weeks in order to improve transfers, ambulation and stair negotiation            Other Measures  Lower Extremity Funtional Score (LEFS): 79

## 2024-07-08 NOTE — PROGRESS NOTES
Patient: Abimael Fischer    [id number]  : 1985 -- AGE 38 y.o.    Provider: Mayi Owens MA     Location [facility]   Service Date: 2024       Department of Medicine  Division of Pulmonary, Critical Care, and Sleep Medicine       [facility]  New Visit Note        HISTORY OF PRESENT ILLNESS     The patient's referring provider is: Jalen Millan MD    HISTORY OF PRESENT ILLNESS   Abimael Fischer is a 38 y.o. male who presents to a [facility] for an evaluation with concerns of No chief complaint on file..   I have independently interviewed and examined the patient in the office and reviewed available records.      Current History    On today's visit, the patient reports ***    Denies premature birth. No childhood pulmonary issues growing up. No pulmonary hospitalizations. Has never been on home oxygen therapy before.    Has never completed a sleep study before.     ACT Today:  ESS Today:     Prior Notes & History       REVIEW OF SYSTEMS     REVIEW OF SYSTEMS  Review of Systems    ALLERGIES AND MEDICATIONS     ALLERGIES  No Known Allergies    MEDICATIONS  Current Outpatient Medications   Medication Sig Dispense Refill    albuterol 90 mcg/actuation inhaler INHALE 2 PUFFS BY MOUTH EVERY 4 HOURS IF NEEDED FOR WHEEZING OR SHORTNESS OF BREATH. 8.5 g 3    budesonide-formoteroL (Symbicort) 80-4.5 mcg/actuation inhaler Inhale 2 puffs 2 times a day. Rinse mouth with water after use to reduce aftertaste and incidence of candidiasis. Do not swallow. 10.2 g 11    cetirizine (ZyrTEC) 10 mg tablet Take 1 tablet (10 mg) by mouth once daily.      ciclopirox (Penlac) 8 % solution Apply topically once daily at bedtime. 6.6 mL 3    tiotropium (Spiriva) 18 mcg inhalation capsule Place 1 capsule (18 mcg) into inhaler and inhale once daily. 30 capsule 1    topiramate (Topamax) 50 mg tablet Take 1 tablet (50 mg) by mouth once daily in  the morning AND 2 tablets (100 mg) once daily in the evening. 270 tablet 3     No current facility-administered medications for this visit.       PAST HISTORY     PAST MEDICAL HISTORY  He  has a past medical history of Facial laceration (03/22/2024) and Infected sebaceous cyst of skin (03/22/2024).    PAST SURGICAL HISTORY  No past surgical history on file.    IMMUNIZATION HISTORY  Immunization History   Administered Date(s) Administered    Flu vaccine (IIV4), preservative free *Check age/dose* 11/22/2023    Flu vaccine, quadrivalent, recombinant, preservative free, adult (FLUBLOK) 11/19/2021, 01/09/2023    Influenza, injectable, MDCK, quadrivalent 11/17/2020    Influenza, injectable, quadrivalent 09/27/2019    Moderna SARS-CoV-2 Vaccination 04/07/2021, 05/07/2021, 12/03/2021    Pfizer Purple Cap SARS-CoV-2 11/22/2023    Tdap vaccine, age 7 year and older (BOOSTRIX, ADACEL) 11/26/2019, 03/15/2024    Varicella vaccine, subcutaneous (VARIVAX) 09/24/2001, 10/23/2001       SOCIAL HISTORY  He  reports that he has never smoked. He has never used smokeless tobacco. He reports that he does not currently use alcohol. He reports that he does not use drugs.     OCCUPATIONAL/ENVIRONMENTAL HISTORY  Previously worked as: ***  Currently works as: ***  {DOES/DOES NOT :09846:::1} have known exposure to asbestos, silica, beryllium or inhaled metals.  {DOES/DOES NOT :97994:::1} have exposure to birds or exotic animals.    FAMILY HISTORY  No family history on file.  {DOES/DOES NOT :29381:::1} have a family history of pulmonary disease.  {DOES/DOES NOT :74917:::1} have a family history of cancer.  {DOES/DOES NOT :66212:::1} have a family history of autoimmune disorders.    PHYSICAL EXAM     VITAL SIGNS: There were no vitals taken for this visit.     PREVIOUS WEIGHTS:  Wt Readings from Last 3 Encounters:   04/01/24 77.1 kg (170 lb)   09/19/23 77.1 kg (170 lb)   03/14/23 77.1 kg (170 lb)       Physical Exam    RESULTS/DATA     Pulmonary  "Function Test Results                         Chest Radiograph     No results found for this or any previous visit from the past 365 days.      Chest CT Scan     No results found for this or any previous visit from the past 365 days.      Echocardiogram & Cardiac Studies     No results found for this or any previous visit from the past 365 days.       Labwork & Pathology   Complete Blood Count  Lab Results   Component Value Date    WBC 6.0 01/14/2023    HGB 17.7 (H) 01/14/2023    HCT 52.2 (H) 01/14/2023    MCV 87.4 01/14/2023     01/14/2023       Peripheral Eosinophil Count:   Eosinophils Absolute (K/UL)   Date Value   01/14/2023 1.01 (H)       Serum Immunoglobulin E:    No results found for: \"IGE\"     Metabolic Parameters  Sodium   Date/Time Value Ref Range Status   01/14/2023 10:55  133 - 145 MMOL/L Final     Potassium   Date/Time Value Ref Range Status   01/14/2023 10:55 AM 4.0 3.4 - 5.1 MMOL/L Final     Chloride   Date/Time Value Ref Range Status   01/14/2023 10:55  97 - 107 MMOL/L Final     Bicarbonate   Date/Time Value Ref Range Status   01/14/2023 10:55 AM 21 (L) 24 - 31 MMOL/L Final     Anion Gap   Date/Time Value Ref Range Status   01/14/2023 10:55 AM 12 0 - 19 MMOL/L Final     Urea Nitrogen   Date/Time Value Ref Range Status   01/14/2023 10:55 AM 14 8 - 25 MG/DL Final     Creatinine   Date/Time Value Ref Range Status   01/14/2023 10:55 AM 1.2 0.4 - 1.6 MG/DL Final     Glucose   Date/Time Value Ref Range Status   01/14/2023 10:55 AM 92 65 - 99 MG/DL Final     Calcium   Date/Time Value Ref Range Status   01/14/2023 10:55 AM 9.4 8.5 - 10.4 MG/DL Final     AST   Date/Time Value Ref Range Status   01/14/2023 10:55 AM 39 5 - 40 U/L Final     ALT (SGPT)   Date/Time Value Ref Range Status   01/14/2023 10:55 AM 64 (H) 5 - 40 U/L Final       Bronchoscopy & Pathology/Cultures       ASSESSMENT/PLAN     Mr. Fischer is a 38 y.o. male; was referred to the [facility] " for evaluation of No chief complaint on file.    Problem List and Orders  {Assess/PlanSmartLinks:36201}    Assessment and Plan / Recommendations:      RTC *** months

## 2024-07-10 ENCOUNTER — APPOINTMENT (OUTPATIENT)
Dept: PULMONOLOGY | Facility: CLINIC | Age: 39
End: 2024-07-10
Payer: COMMERCIAL

## 2025-04-01 ENCOUNTER — APPOINTMENT (OUTPATIENT)
Dept: PRIMARY CARE | Facility: CLINIC | Age: 40
End: 2025-04-01

## 2025-05-03 DIAGNOSIS — J45.30 MILD PERSISTENT ASTHMA WITHOUT COMPLICATION (HHS-HCC): ICD-10-CM

## 2025-05-03 DIAGNOSIS — J30.2 SEASONAL ALLERGIES: ICD-10-CM

## 2025-05-05 RX ORDER — BUDESONIDE AND FORMOTEROL FUMARATE DIHYDRATE 80; 4.5 UG/1; UG/1
AEROSOL RESPIRATORY (INHALATION)
Qty: 10.2 G | Refills: 11 | Status: SHIPPED | OUTPATIENT
Start: 2025-05-05

## 2025-06-07 DIAGNOSIS — F95.2 GILLES DE LA TOURETTE'S SYNDROME: ICD-10-CM

## 2025-06-09 RX ORDER — TOPIRAMATE 50 MG/1
TABLET, FILM COATED ORAL
Qty: 270 TABLET | Refills: 3 | Status: SHIPPED | OUTPATIENT
Start: 2025-06-09

## 2025-07-07 ENCOUNTER — OFFICE VISIT (OUTPATIENT)
Dept: PRIMARY CARE | Facility: CLINIC | Age: 40
End: 2025-07-07
Payer: COMMERCIAL

## 2025-07-07 VITALS
SYSTOLIC BLOOD PRESSURE: 118 MMHG | WEIGHT: 172 LBS | HEIGHT: 68 IN | HEART RATE: 75 BPM | DIASTOLIC BLOOD PRESSURE: 80 MMHG | OXYGEN SATURATION: 99 % | BODY MASS INDEX: 26.07 KG/M2

## 2025-07-07 DIAGNOSIS — J45.40 MODERATE PERSISTENT ASTHMA WITHOUT COMPLICATION (HHS-HCC): ICD-10-CM

## 2025-07-07 DIAGNOSIS — R73.9 HYPERGLYCEMIA: ICD-10-CM

## 2025-07-07 DIAGNOSIS — M79.604 RIGHT LEG PAIN: ICD-10-CM

## 2025-07-07 DIAGNOSIS — F95.2 GILLES DE LA TOURETTE'S SYNDROME: ICD-10-CM

## 2025-07-07 DIAGNOSIS — Z23 ENCOUNTER FOR IMMUNIZATION: ICD-10-CM

## 2025-07-07 DIAGNOSIS — Z00.00 ANNUAL PHYSICAL EXAM: Primary | ICD-10-CM

## 2025-07-07 DIAGNOSIS — J30.2 SEASONAL ALLERGIES: ICD-10-CM

## 2025-07-07 DIAGNOSIS — E55.9 VITAMIN D DEFICIENCY: ICD-10-CM

## 2025-07-07 DIAGNOSIS — Z01.89 ENCOUNTER FOR ROUTINE LABORATORY TESTING: ICD-10-CM

## 2025-07-07 DIAGNOSIS — E78.2 MIXED HYPERLIPIDEMIA: ICD-10-CM

## 2025-07-07 PROCEDURE — 3008F BODY MASS INDEX DOCD: CPT | Performed by: STUDENT IN AN ORGANIZED HEALTH CARE EDUCATION/TRAINING PROGRAM

## 2025-07-07 PROCEDURE — 99395 PREV VISIT EST AGE 18-39: CPT | Performed by: STUDENT IN AN ORGANIZED HEALTH CARE EDUCATION/TRAINING PROGRAM

## 2025-07-07 PROCEDURE — 1036F TOBACCO NON-USER: CPT | Performed by: STUDENT IN AN ORGANIZED HEALTH CARE EDUCATION/TRAINING PROGRAM

## 2025-07-07 RX ORDER — BUDESONIDE AND FORMOTEROL FUMARATE DIHYDRATE 80; 4.5 UG/1; UG/1
2 AEROSOL RESPIRATORY (INHALATION)
Qty: 10.2 G | Refills: 11 | Status: SHIPPED | OUTPATIENT
Start: 2025-07-07 | End: 2025-07-07 | Stop reason: SDUPTHER

## 2025-07-07 RX ORDER — ALBUTEROL SULFATE 90 UG/1
2 INHALANT RESPIRATORY (INHALATION) EVERY 4 HOURS PRN
Qty: 8 G | Refills: 1 | Status: SHIPPED | OUTPATIENT
Start: 2025-07-07

## 2025-07-07 RX ORDER — TOPIRAMATE 50 MG/1
TABLET, FILM COATED ORAL
Qty: 270 TABLET | Refills: 3 | Status: SHIPPED | OUTPATIENT
Start: 2025-07-07 | End: 2026-07-07

## 2025-07-07 RX ORDER — BUDESONIDE AND FORMOTEROL FUMARATE DIHYDRATE 80; 4.5 UG/1; UG/1
2 AEROSOL RESPIRATORY (INHALATION)
Qty: 30.6 G | Refills: 3 | Status: SHIPPED | OUTPATIENT
Start: 2025-07-07 | End: 2026-07-07

## 2025-07-07 ASSESSMENT — PAIN SCALES - GENERAL: PAINLEVEL_OUTOF10: 0-NO PAIN

## 2025-07-07 ASSESSMENT — ENCOUNTER SYMPTOMS
CARDIOVASCULAR NEGATIVE: 1
CONSTITUTIONAL NEGATIVE: 1
MUSCULOSKELETAL NEGATIVE: 1
EYES NEGATIVE: 1
RESPIRATORY NEGATIVE: 1
ENDOCRINE NEGATIVE: 1
ALLERGIC/IMMUNOLOGIC NEGATIVE: 1
PSYCHIATRIC NEGATIVE: 1
GASTROINTESTINAL NEGATIVE: 1
HEMATOLOGIC/LYMPHATIC NEGATIVE: 1
NEUROLOGICAL NEGATIVE: 1

## 2025-07-07 ASSESSMENT — PATIENT HEALTH QUESTIONNAIRE - PHQ9
2. FEELING DOWN, DEPRESSED OR HOPELESS: NOT AT ALL
1. LITTLE INTEREST OR PLEASURE IN DOING THINGS: NOT AT ALL
SUM OF ALL RESPONSES TO PHQ9 QUESTIONS 1 AND 2: 0

## 2025-07-07 NOTE — PATIENT INSTRUCTIONS
- Overall, the patient feels well and denies any acute symptoms or concerns at this time.  - Blood pressure at goal today.  - Encouraged continued dietary, exercise, and lifestyle modification.  - Significant medication and problem list reconciliation done today.     Discussed routine and/or preventative care with the patient as outlined below:  - Labwork:   - Patient appears to be due for labwork. Ordered today.   - Will order labwork for the patient's next appointment. Encouraged the patient to get this labwork done one week prior to the next appointment.  - Imaging:   - Patient does not appear to be due for routine imaging at this time.  - Immunizations:   - Patient does not appear to be due for routine immunizations at this time.

## 2025-07-07 NOTE — PROGRESS NOTES
[facility]: [facility]  PHYSICAL EXAM      Abimael Fischer is a 39 y.o. male that is presenting today for Annual Exam.    Assessment/Plan   - Overall, the patient feels well and denies any acute symptoms or concerns at this time.  - Blood pressure at goal today.  - Encouraged continued dietary, exercise, and lifestyle modification.  - Significant medication and problem list reconciliation done today.     Discussed routine and/or preventative care with the patient as outlined below:  - Labwork:   - Patient appears to be due for labwork. Ordered today.   - Will order labwork for the patient's next appointment. Encouraged the patient to get this labwork done one week prior to the next appointment.  - Imaging:   - Patient does not appear to be due for routine imaging at this time.  - Immunizations:   - Patient does not appear to be due for routine immunizations at this time.     Diagnoses and all orders for this visit:  Annual physical exam  -     Follow Up In Primary Care  -     Follow Up In Primary Care; Future  Encounter for routine laboratory testing  Mixed hyperlipidemia  -     CBC and Auto Differential; Future  -     Comprehensive Metabolic Panel; Future  -     Lipid Panel; Future  -     Hemoglobin A1C; Future  -     TSH with reflex to Free T4 if abnormal; Future  -     Vitamin D 25-Hydroxy,Total (for eval of Vitamin D levels); Future  -     CBC and Auto Differential; Future  -     Comprehensive Metabolic Panel; Future  -     Lipid Panel; Future  -     Hemoglobin A1C; Future  -     TSH with reflex to Free T4 if abnormal; Future  -     Vitamin D 25-Hydroxy,Total (for eval of Vitamin D levels); Future  Vitamin D deficiency  -     CBC and Auto Differential; Future  -     Comprehensive Metabolic Panel; Future  -     Lipid Panel; Future  -     Hemoglobin A1C; Future  -     TSH with reflex to Free T4 if abnormal; Future  -     Vitamin D 25-Hydroxy,Total (for eval of Vitamin D levels); Future  -      CBC and Auto Differential; Future  -     Comprehensive Metabolic Panel; Future  -     Lipid Panel; Future  -     Hemoglobin A1C; Future  -     TSH with reflex to Free T4 if abnormal; Future  -     Vitamin D 25-Hydroxy,Total (for eval of Vitamin D levels); Future  Hyperglycemia  -     CBC and Auto Differential; Future  -     Comprehensive Metabolic Panel; Future  -     Lipid Panel; Future  -     Hemoglobin A1C; Future  -     TSH with reflex to Free T4 if abnormal; Future  -     Vitamin D 25-Hydroxy,Total (for eval of Vitamin D levels); Future  -     CBC and Auto Differential; Future  -     Comprehensive Metabolic Panel; Future  -     Lipid Panel; Future  -     Hemoglobin A1C; Future  -     TSH with reflex to Free T4 if abnormal; Future  -     Vitamin D 25-Hydroxy,Total (for eval of Vitamin D levels); Future  Encounter for immunization  Moderate persistent asthma without complication ([facility])  -     albuterol 90 mcg/actuation inhaler; Inhale 2 puffs every 4 hours if needed for wheezing or shortness of breath.  -     budesonide-formoterol (Symbicort) 80-4.5 mcg/actuation inhaler; Inhale 2 puffs 2 times a day. Rinse mouth with water after use to reduce aftertaste and incidence of candidiasis. Do not swallow.  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  Seasonal allergies  -     albuterol 90 mcg/actuation inhaler; Inhale 2 puffs every 4 hours if needed for wheezing or shortness of breath.  -     budesonide-formoterol (Symbicort) 80-4.5 mcg/actuation inhaler; Inhale 2 puffs 2 times a day. Rinse mouth with water after use to reduce aftertaste and incidence of candidiasis. Do not swallow.  Right leg pain    Current Outpatient Medications   Medication Instructions    albuterol 90 mcg/actuation inhaler 2 puffs, inhalation, Every 4 hours PRN    budesonide-formoterol (Symbicort) 80-4.5 mcg/actuation inhaler 2  puffs, inhalation, 2 times daily RT, Rinse mouth with water after use to reduce aftertaste and incidence of candidiasis. Do not swallow.    cetirizine (ZYRTEC) 10 mg, oral, Daily    ciclopirox (Penlac) 8 % solution Topical, Nightly    topiramate (Topamax) 50 mg tablet Take 1 tablet (50 mg) by mouth once daily AND 2 tablets (100 mg) once daily at bedtime.     Subjective   - The patient otherwise feels well and denies any acute symptoms or concerns at this time.  - The patient denies any changes or progression of their chronic medical problems.  - The patient denies any problems or concerns with their medications.      Review of Systems   Constitutional: Negative.    HENT: Negative.     Eyes: Negative.    Respiratory: Negative.     Cardiovascular: Negative.    Gastrointestinal: Negative.    Endocrine: Negative.    Genitourinary: Negative.    Musculoskeletal: Negative.    Skin: Negative.    Allergic/Immunologic: Negative.    Neurological: Negative.    Hematological: Negative.    Psychiatric/Behavioral: Negative.     All other systems reviewed and are negative.     Objective   Vitals:    07/07/25 0803   BP: 118/80   Pulse: 75   SpO2: 99%     Body mass index is 26.16 kg/m².  Physical Exam  Vitals and nursing note reviewed.   Constitutional:       General: He is not in acute distress.     Appearance: Normal appearance. He is not ill-appearing.   HENT:      Head: Normocephalic and atraumatic.      Right Ear: Tympanic membrane, ear canal and external ear normal. There is no impacted cerumen.      Left Ear: Tympanic membrane, ear canal and external ear normal. There is no impacted cerumen.      Nose: Nose normal.      Mouth/Throat:      Mouth: Mucous membranes are moist.      Pharynx: Oropharynx is clear. No oropharyngeal exudate or posterior oropharyngeal erythema.   Eyes:      General: No scleral icterus.        Right eye: No discharge.         Left eye: No discharge.      Extraocular Movements: Extraocular movements  intact.      Conjunctiva/sclera: Conjunctivae normal.      Pupils: Pupils are equal, round, and reactive to light.   Neck:      Vascular: No carotid bruit.   Cardiovascular:      Rate and Rhythm: Normal rate and regular rhythm.      Pulses: Normal pulses.      Heart sounds: Normal heart sounds. No murmur heard.     No friction rub. No gallop.   Pulmonary:      Effort: Pulmonary effort is normal. No respiratory distress.      Breath sounds: Normal breath sounds.   Abdominal:      General: Abdomen is flat. Bowel sounds are normal.      Palpations: Abdomen is soft.      Tenderness: There is no abdominal tenderness.      Hernia: No hernia is present.   Musculoskeletal:         General: No swelling. Normal range of motion.      Cervical back: Normal range of motion.   Lymphadenopathy:      Cervical: No cervical adenopathy.   Skin:     General: Skin is warm and dry.      Coloration: Skin is not jaundiced.      Findings: No rash.   Neurological:      General: No focal deficit present.      Mental Status: He is alert and oriented to person, place, and time. Mental status is at baseline.   Psychiatric:         Mood and Affect: Mood normal.         Behavior: Behavior normal.       Diagnostic Results   Lab Results   Component Value Date    GLUCOSE 92 01/14/2023    CALCIUM 9.4 01/14/2023     01/14/2023    K 4.0 01/14/2023    CO2 21 (L) 01/14/2023     01/14/2023    BUN 14 01/14/2023    CREATININE 1.2 01/14/2023     Lab Results   Component Value Date    ALT 64 (H) 01/14/2023    AST 39 01/14/2023    ALKPHOS 81 01/14/2023    BILITOT 0.3 01/14/2023     Lab Results   Component Value Date    WBC 6.0 01/14/2023    HGB 17.7 (H) 01/14/2023    HCT 52.2 (H) 01/14/2023    MCV 87.4 01/14/2023     01/14/2023     Lab Results   Component Value Date    CHOL 197 01/14/2023    CHOL 179 09/28/2019    CHOL 194 10/13/2018     Lab Results   Component Value Date    HDL 49 01/14/2023    HDL 53 09/28/2019    HDL 49 10/13/2018     Lab  "Results   Component Value Date    LDLCALC 128 01/14/2023    LDLCALC 100 09/28/2019    LDLCALC 119 10/13/2018     Lab Results   Component Value Date    TRIG 100 01/14/2023    TRIG 130 09/28/2019    TRIG 132 10/13/2018     No components found for: \"CHOLHDL\"  Lab Results   Component Value Date    HGBA1C 4.9 01/14/2023     Other labs not included in the list above were reviewed either before or during this encounter.    History   Medical History[1]  Surgical History[2]  Family History[3]  Social History     Socioeconomic History    Marital status:      Spouse name: Not on file    Number of children: Not on file    Years of education: Not on file    Highest education level: Not on file   Occupational History    Not on file   Tobacco Use    Smoking status: Never    Smokeless tobacco: Never   Vaping Use    Vaping status: Never Used   Substance and Sexual Activity    Alcohol use: Not Currently    Drug use: Never    Sexual activity: Not on file   Other Topics Concern    Not on file   Social History Narrative    Not on file     Social Drivers of Health     Financial Resource Strain: Not on file   Food Insecurity: Not on file   Transportation Needs: Not on file   Physical Activity: Not on file   Stress: Not on file   Social Connections: Not on file   Intimate Partner Violence: Not on file   Housing Stability: Not on file     Allergies[4]  Medications Ordered Prior to Encounter[5]  Immunization History   Administered Date(s) Administered    Flu vaccine (IIV4), preservative free *Check age/dose* 11/22/2023    Flu vaccine, quadrivalent, recombinant, preservative free, adult (FLUBLOK) 11/19/2021, 01/09/2023    Influenza, injectable, MDCK, quadrivalent 11/17/2020    Influenza, injectable, quadrivalent 09/27/2019    Moderna SARS-CoV-2 Vaccination 04/07/2021, 05/07/2021, 12/03/2021    Pfizer Purple Cap SARS-CoV-2 11/22/2023    Tdap vaccine, age 7 year and older (BOOSTRIX, ADACEL) 11/26/2019, 03/15/2024    Varicella vaccine, " subcutaneous (VARIVAX) 09/24/2001, 10/23/2001     Patient's medical history was reviewed and updated either before or during this encounter.       Jalen Millan MD         [1]   Past Medical History:  Diagnosis Date    Facial laceration 03/22/2024    Infected sebaceous cyst of skin 03/22/2024   [2] History reviewed. No pertinent surgical history.  [3] No family history on file.  [4] No Known Allergies  [5]   Current Outpatient Medications on File Prior to Visit   Medication Sig Dispense Refill    cetirizine (ZyrTEC) 10 mg tablet Take 1 tablet (10 mg) by mouth once daily.      ciclopirox (Penlac) 8 % solution Apply topically once daily at bedtime. 6.6 mL 3    [DISCONTINUED] albuterol 90 mcg/actuation inhaler INHALE 2 PUFFS BY MOUTH EVERY 4 HOURS IF NEEDED FOR WHEEZING OR SHORTNESS OF BREATH. 8.5 g 3    [DISCONTINUED] budesonide-formoterol (Symbicort) 80-4.5 mcg/actuation inhaler INHALE 2 PUFFS BY MOUTH 2 TIMES A DAY. RINSE MOUTH WITH WATER AFTER USE TO REDUCE AFTERTASTE AND INCIDENCE OF CANDIDIASIS. DO NOT SWALLOW. 10.2 g 11    [DISCONTINUED] topiramate (Topamax) 50 mg tablet TAKE 1 TABLET( 50 MG) BY MOUTH EVERYDAY IN THE MORNING AND 2 TABLETS (100 MG) DAILY IN THE EVENING 270 tablet 3    [DISCONTINUED] tiotropium (Spiriva) 18 mcg inhalation capsule Place 1 capsule (18 mcg) into inhaler and inhale once daily. (Patient not taking: Reported on 7/7/2025) 30 capsule 1     No current facility-administered medications on file prior to visit.